# Patient Record
Sex: FEMALE | Race: BLACK OR AFRICAN AMERICAN | NOT HISPANIC OR LATINO
[De-identification: names, ages, dates, MRNs, and addresses within clinical notes are randomized per-mention and may not be internally consistent; named-entity substitution may affect disease eponyms.]

---

## 2024-06-10 PROBLEM — Z00.00 ENCOUNTER FOR PREVENTIVE HEALTH EXAMINATION: Status: ACTIVE | Noted: 2024-06-10

## 2024-06-12 ENCOUNTER — APPOINTMENT (OUTPATIENT)
Dept: PULMONOLOGY | Facility: CLINIC | Age: 59
End: 2024-06-12
Payer: COMMERCIAL

## 2024-06-12 VITALS
BODY MASS INDEX: 35.55 KG/M2 | HEIGHT: 69 IN | HEART RATE: 80 BPM | OXYGEN SATURATION: 98 % | TEMPERATURE: 97.9 F | DIASTOLIC BLOOD PRESSURE: 75 MMHG | WEIGHT: 240 LBS | SYSTOLIC BLOOD PRESSURE: 121 MMHG

## 2024-06-12 DIAGNOSIS — R06.00 DYSPNEA, UNSPECIFIED: ICD-10-CM

## 2024-06-12 RX ORDER — AZITHROMYCIN 250 MG/1
250 TABLET, FILM COATED ORAL
Refills: 0 | Status: ACTIVE | COMMUNITY

## 2024-06-12 RX ORDER — BENZOYL PEROXIDE 100 MG/G
10 EMULSION TOPICAL
Refills: 0 | Status: ACTIVE | COMMUNITY

## 2024-06-12 RX ORDER — METFORMIN HYDROCHLORIDE 500 MG/1
500 TABLET, COATED ORAL
Refills: 0 | Status: ACTIVE | COMMUNITY

## 2024-06-12 NOTE — ASSESSMENT
[FreeTextEntry1] :  60 yo w pre-DM and high cholesterol presents for eval of dyspnea for 6 years following a respiratory tract infection.  Dyspnea on exertion is of unclear etiology at this time.  Physical exam is normal.  Pulmonary function testing is essentially normal except for mildly reduced FEF 25-75% possibly signify small airways disease and mildly reduced diffusing capacity.  6-minute walk test however is entirely normal.  Further workup to include: Chest x-ray Methacholine challenge test   Follow-up in 2 weeks with above testing

## 2024-06-12 NOTE — RESULTS/DATA
[TextEntry] : RADIOLOGY     PFT 6/12/2024 Prebronchodilator: FVC: 3.63 L, 115% predicted FEV1: 2.48 L, 100% predicted FEV1/FVC: 68% FEF 25-75%: 1.39 L, 58% predicted Postbronchodilator: No significant change in FEV1 or FVC, however there is a 55% increase in FEF 25-75% TLC_N2: 5.28 L, 94% predicted RV_N2: 1.65 L, 80% predicted DLCO: 16.12 units, 69% predicted Robbie: 7 ppb 6-minute walk: Pre-: SpO2 96%, heart rate 73, blood pressure 120/87 Post: SpO2 96%, heart rate 98, blood pressure 129/79 Distance walked: 444 m Impression: Normal spirometry except for mildly reduced FEF 25-75 with an increase in this measure after bronchodilator, possibly suggestive of small airways disease.  However there is no evidence of air trapping on lung volume measurements.  Diffusing capacity is mildly reduced.  6 walk minute distance is normal as are oxygen saturation, heart rate and blood pressure at rest and with exercise.  EKG / ECHO      MICRO     PATH    OTHER LABS OF NOTE Outside labs performed with the PCP reviewed and notable for hemoglobin A1c of 6.3 (elevated) (, alk phos of 130 (elevated), total cholesterol 216 (elevated),  (elevated), vitamin D of 22 (reduced), TSH 0.896 (normal), GH 0.73 (reduced)

## 2024-06-12 NOTE — REVIEW OF SYSTEMS
[Recent Wt Gain (___ Lbs)] : ~T recent [unfilled] lb weight gain [Poor Appetite] : poor appetite [Dry Eyes] : dry eyes [Nasal Congestion] : nasal congestion [Postnasal Drip] : postnasal drip [Dry Mouth] : dry mouth [Sinus Problems] : sinus problems [Cough] : cough [Sputum] : sputum [Seasonal Allergies] : seasonal allergies [Menopause] : menopause [Arthralgias] : arthralgias [Fracture] : fracture [Headache] : headache [Fever] : no fever [Fatigue] : no fatigue [Chills] : no chills [Recent Wt Loss (___ Lbs)] : ~T no recent weight loss [Mouth Ulcers] : no mouth ulcers [Edema] : no edema [Watery Eyes] : no watery eyes [Itchy Eyes] : no itchy eyes [Hives] : no hives [GERD] : no gerd [Abdominal Pain] : no abdominal pain [Nausea] : no nausea [Vomiting] : no vomiting [Diarrhea] : no diarrhea [Constipation] : no constipation [Dysphagia] : no dysphagia [Nocturia] : no nocturia [Dysuria] : no dysuria [Back Pain] : no back pain [Rash] : no rash [Anemia] : no anemia [Easy Bruising] : no easy bruising [Seizures] : no seizures [Head Injury] : no head injury [Dizziness] : no dizziness [Depression] : no depression [Anxiety] : no anxiety [Panic Attacks] : no panic attacks [TextBox_14] : nasal congestion [TextBox_94] : left knee, right 4th finger

## 2024-06-12 NOTE — HISTORY OF PRESENT ILLNESS
[TextBox_4] : 60 yo w pre-DM and high cholesterol presents for eval of dyspnea for 6 years Was very sick in 2018: drenching sweats, shortness of breath, could not lie down (feeling smothered), fever ~ 102, cough. Almost went to ER but didn't. Stayed home for week and 1/2 recovering. No abx. Self-care. Dyspnea persisted ever since Cough lasted for a "long time"  COVID in summer 2019. 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 Brother had open heart surg. Two brothers with pancreatic ca

## 2024-06-12 NOTE — HISTORY OF PRESENT ILLNESS
[TextBox_4] : 60 yo w pre-DM and high cholesterol presents for eval of dyspnea for 6 years Was very sick in 2018: drenching sweats, shortness of breath, could not lie down (feeling smothered), fever ~ 102, cough. Almost went to ER but didn't. Stayed home for week and 1/2 recovering. No abx. Self-care. Dyspnea persisted ever since Cough lasted for a "long time"  COVID in summer 2019. 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 Brother had open heart surg. Two brothers with pancreatic ca

## 2024-06-12 NOTE — PHYSICAL EXAM
[TextEntry] : Gen: Pleasant in no distress HEENT: Sclera non-icteric, conjunctiva non-injected; oropharynx clear w/o thrush;  Neck: supple w/o cervical LAD; no bruits CV: Regular rate and rhythm, no murmurs, rubs or gallops Lungs: CTAB, no rales, wheezes, squeaks or rhonchi Extremities: no clubbing, cyanosis or edema Skin: no rash Psych: normal mood and affect Neuro:  non-focal

## 2024-06-12 NOTE — SOCIAL HISTORY
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

## 2024-06-12 NOTE — FAMILY HISTORY
[TextEntry] : no fam hx of uzma dx on mother's side; not known on father's side. Brother had open heart surg. Two brothers with pancreatic ca.

## 2024-06-12 NOTE — SOCIAL HISTORY
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

## 2024-06-26 ENCOUNTER — APPOINTMENT (OUTPATIENT)
Dept: PULMONOLOGY | Facility: CLINIC | Age: 59
End: 2024-06-26
Payer: COMMERCIAL

## 2024-06-26 VITALS
BODY MASS INDEX: 33.03 KG/M2 | SYSTOLIC BLOOD PRESSURE: 121 MMHG | DIASTOLIC BLOOD PRESSURE: 73 MMHG | WEIGHT: 223 LBS | HEART RATE: 72 BPM | HEIGHT: 69 IN | OXYGEN SATURATION: 97 %

## 2024-06-26 DIAGNOSIS — J45.909 UNSPECIFIED ASTHMA, UNCOMPLICATED: ICD-10-CM

## 2024-06-26 RX ORDER — FLUTICASONE FUROATE AND VILANTEROL TRIFENATATE 100; 25 UG/1; UG/1
100-25 POWDER RESPIRATORY (INHALATION)
Qty: 1 | Refills: 6 | Status: ACTIVE | COMMUNITY
Start: 2024-06-26 | End: 1900-01-01

## 2024-08-28 ENCOUNTER — APPOINTMENT (OUTPATIENT)
Dept: PULMONOLOGY | Facility: CLINIC | Age: 59
End: 2024-08-28
Payer: COMMERCIAL

## 2024-08-28 VITALS
WEIGHT: 212 LBS | SYSTOLIC BLOOD PRESSURE: 122 MMHG | DIASTOLIC BLOOD PRESSURE: 84 MMHG | BODY MASS INDEX: 31.4 KG/M2 | OXYGEN SATURATION: 98 % | HEART RATE: 80 BPM | TEMPERATURE: 97 F | HEIGHT: 69 IN

## 2024-08-28 PROCEDURE — 99212 OFFICE O/P EST SF 10 MIN: CPT | Mod: 25

## 2024-08-28 PROCEDURE — 95012 NITRIC OXIDE EXP GAS DETER: CPT

## 2024-08-28 RX ORDER — FLUTICASONE FUROATE AND VILANTEROL TRIFENATATE 200; 25 UG/1; UG/1
200-25 POWDER RESPIRATORY (INHALATION) DAILY
Qty: 1 | Refills: 3 | Status: ACTIVE | COMMUNITY
Start: 2024-08-28 | End: 1900-01-01

## 2024-08-28 NOTE — HISTORY OF PRESENT ILLNESS
[Never] : never [TextBox_4] : 60 yo w pre-DM and high cholesterol presents for eval of dyspnea for 6 years Was very sick in 2019: drenching sweats, shortness of breath, could not lie down (feeling smothered), fever ~ 102, cough. Almost went to ER but didn't. Stayed home for week and 1/2 recovering. No abx. Self-care. Dyspnea persisted ever since Cough lasted for a "long time"  COVID in summer 2019. 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

## 2024-08-28 NOTE — ASSESSMENT
[FreeTextEntry1] :  60 yo w pre-DM and high cholesterol presents for eval of dyspnea for 6 years following a respiratory tract infection.  Physical exam is normal.  Pulmonary function testing is essentially normal except for mildly reduced FEF 25-75% possibly signify small airways disease and mildly reduced diffusing capacity.  6-minute walk test however is entirely normal.  Esposito: Challenge test, however is abnormal with moderate to severe hyperresponsiveness.  Suspect asthma  #Asthma --continue Breo Ellipta: increase dose to 200 --albuterol PRN  Follow-up 3 months

## 2024-08-28 NOTE — RESULTS/DATA
[TextEntry] : RADIOLOGY     PFT 8/28/24 Cathy: 9ppb  6/27/24 Methacholine challenge test: Interpretation: Positive reaction to method: PC 20 = 0.487 mg/mL.  This is considered to be a moderately severe bronchial hyperresponsiveness  6/12/2024 Prebronchodilator: FVC: 3.63 L, 115% predicted FEV1: 2.48 L, 100% predicted FEV1/FVC: 68% FEF 25-75%: 1.39 L, 58% predicted Postbronchodilator: No significant change in FEV1 or FVC, however there is a 55% increase in FEF 25-75% TLC_N2: 5.28 L, 94% predicted RV_N2: 1.65 L, 80% predicted DLCO: 16.12 units, 69% predicted Cathy: 7 ppb 6-minute walk: Pre-: SpO2 96%, heart rate 73, blood pressure 120/87 Post: SpO2 96%, heart rate 98, blood pressure 129/79 Distance walked: 444 m Impression: Normal spirometry except for mildly reduced FEF 25-75 with an increase in this measure after bronchodilator, possibly suggestive of small airways disease.  However there is no evidence of air trapping on lung volume measurements.  Diffusing capacity is mildly reduced.  6 walk minute distance is normal as are oxygen saturation, heart rate and blood pressure at rest and with exercise.  EKG / ECHO      MICRO     PATH    OTHER LABS OF NOTE Outside labs performed with the PCP reviewed and notable for hemoglobin A1c of 6.3 (elevated) (, alk phos of 130 (elevated), total cholesterol 216 (elevated),  (elevated), vitamin D of 22 (reduced), TSH 0.896 (normal), GH 0.73 (reduced)

## 2024-08-28 NOTE — FAMILY HISTORY
[TextEntry] : no fam hx of diamond dx on mother's side; not known on father's side. Brother had open heart surg. Two brothers with pancreatic ca.

## 2024-08-28 NOTE — HISTORY OF PRESENT ILLNESS
[Never] : never [TextBox_4] : 60 yo w pre-DM and high cholesterol presents for eval of dyspnea for 6 years Was very sick in 2019: drenching sweats, shortness of breath, could not lie down (feeling smothered), fever ~ 102, cough. Almost went to ER but didn't. Stayed home for week and 1/2 recovering. No abx. Self-care. Dyspnea persisted ever since Cough lasted for a "long time"  COVID in summer 2019. Not as bad as the 2018 illness, but similar in some ways: "terrible breathing, lots of coughing, sweating", lasted a long time  SOB with subway stairs, has to stop, breathing really-really hard, has to go slow, can't "[place] walk anymore" PCP rx albuterol last year - not helping Has occasional cough  Tested + for PIVIII 2 weeks ago  Was getting sick frequently as a kid with chest colds no asthma dx as a child  6/26/24 no change in sx, though a little more difficult to breath with the heat and humidity  8/28/24 at last visit [facility] was positive; started on Breo Since then - feels much better, much less dyspnea on exertion (able to walk up the stairs w/o much dyspnea), though still some no albuterol need no nocturnal sx

## 2024-08-28 NOTE — SOCIAL HISTORY
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

## 2024-08-28 NOTE — SOCIAL HISTORY
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

## 2024-08-28 NOTE — ASSESSMENT
[FreeTextEntry1] :  58 yo w pre-DM and high cholesterol presents for eval of dyspnea for 6 years following a respiratory tract infection.  Physical exam is normal.  Pulmonary function testing is essentially normal except for mildly reduced FEF 25-75% possibly signify small airways disease and mildly reduced diffusing capacity.  6-minute walk test however is entirely normal.  Esposito: Challenge test, however is abnormal with moderate to severe hyperresponsiveness.  Suspect asthma  #Asthma --continue Breo Ellipta: increase dose to 200 --albuterol PRN  Follow-up 3 months

## 2024-11-20 ENCOUNTER — APPOINTMENT (OUTPATIENT)
Dept: PULMONOLOGY | Facility: CLINIC | Age: 59
End: 2024-11-20
Payer: COMMERCIAL

## 2024-11-20 ENCOUNTER — NON-APPOINTMENT (OUTPATIENT)
Age: 59
End: 2024-11-20

## 2024-11-20 VITALS
HEIGHT: 69 IN | OXYGEN SATURATION: 98 % | HEART RATE: 70 BPM | SYSTOLIC BLOOD PRESSURE: 135 MMHG | WEIGHT: 212 LBS | BODY MASS INDEX: 31.4 KG/M2 | TEMPERATURE: 97.9 F | DIASTOLIC BLOOD PRESSURE: 77 MMHG

## 2024-11-20 PROCEDURE — 99212 OFFICE O/P EST SF 10 MIN: CPT

## 2025-03-10 ENCOUNTER — RX RENEWAL (OUTPATIENT)
Age: 60
End: 2025-03-10

## 2025-05-01 ENCOUNTER — APPOINTMENT (OUTPATIENT)
Dept: PULMONOLOGY | Facility: CLINIC | Age: 60
End: 2025-05-01
Payer: COMMERCIAL

## 2025-05-01 DIAGNOSIS — Z23 ENCOUNTER FOR IMMUNIZATION: ICD-10-CM

## 2025-05-01 PROCEDURE — 99213 OFFICE O/P EST LOW 20 MIN: CPT | Mod: 25

## 2025-05-01 PROCEDURE — 90677 PCV20 VACCINE IM: CPT

## 2025-05-01 PROCEDURE — G0009: CPT

## 2025-05-01 RX ORDER — FLUTICASONE FUROATE AND VILANTEROL TRIFENATATE 100; 25 UG/1; UG/1
100-25 POWDER RESPIRATORY (INHALATION)
Qty: 3 | Refills: 3 | Status: ACTIVE | COMMUNITY
Start: 2025-05-01 | End: 1900-01-01

## 2025-08-06 ENCOUNTER — APPOINTMENT (OUTPATIENT)
Dept: PULMONOLOGY | Facility: CLINIC | Age: 60
End: 2025-08-06